# Patient Record
Sex: MALE | Race: WHITE
[De-identification: names, ages, dates, MRNs, and addresses within clinical notes are randomized per-mention and may not be internally consistent; named-entity substitution may affect disease eponyms.]

---

## 2022-09-21 ENCOUNTER — HOSPITAL ENCOUNTER (INPATIENT)
Dept: HOSPITAL 47 - 2ORMAIN | Age: 62
LOS: 1 days | Discharge: HOME | DRG: 38 | End: 2022-09-22
Attending: SURGERY | Admitting: SURGERY
Payer: COMMERCIAL

## 2022-09-21 DIAGNOSIS — Z79.02: ICD-10-CM

## 2022-09-21 DIAGNOSIS — Z79.82: ICD-10-CM

## 2022-09-21 DIAGNOSIS — E78.5: ICD-10-CM

## 2022-09-21 DIAGNOSIS — Z79.899: ICD-10-CM

## 2022-09-21 DIAGNOSIS — I42.9: ICD-10-CM

## 2022-09-21 DIAGNOSIS — I65.21: Primary | ICD-10-CM

## 2022-09-21 DIAGNOSIS — Z86.73: ICD-10-CM

## 2022-09-21 DIAGNOSIS — F17.200: ICD-10-CM

## 2022-09-21 DIAGNOSIS — I10: ICD-10-CM

## 2022-09-21 DIAGNOSIS — K59.00: ICD-10-CM

## 2022-09-21 LAB
ALBUMIN SERPL-MCNC: 4.2 G/DL (ref 3.5–5)
ALP SERPL-CCNC: 88 U/L (ref 38–126)
ALT SERPL-CCNC: 30 U/L (ref 4–49)
ANION GAP SERPL CALC-SCNC: 9 MMOL/L
APTT BLD: 25.7 SEC (ref 22–30)
AST SERPL-CCNC: 31 U/L (ref 17–59)
BASOPHILS # BLD AUTO: 0 K/UL (ref 0–0.2)
BASOPHILS NFR BLD AUTO: 0 %
BUN SERPL-SCNC: 23 MG/DL (ref 9–20)
CALCIUM SPEC-MCNC: 8.8 MG/DL (ref 8.4–10.2)
CHLORIDE SERPL-SCNC: 108 MMOL/L (ref 98–107)
CO2 SERPL-SCNC: 23 MMOL/L (ref 22–30)
EOSINOPHIL # BLD AUTO: 0.5 K/UL (ref 0–0.7)
EOSINOPHIL NFR BLD AUTO: 5 %
ERYTHROCYTE [DISTWIDTH] IN BLOOD BY AUTOMATED COUNT: 4.26 M/UL (ref 4.3–5.9)
ERYTHROCYTE [DISTWIDTH] IN BLOOD: 12.8 % (ref 11.5–15.5)
GLUCOSE BLD-MCNC: 123 MG/DL (ref 70–110)
GLUCOSE SERPL-MCNC: 105 MG/DL (ref 74–99)
HCT VFR BLD AUTO: 39.2 % (ref 39–53)
HGB BLD-MCNC: 13.4 GM/DL (ref 13–17.5)
INR PPP: 0.9 (ref ?–1.2)
LYMPHOCYTES # SPEC AUTO: 2.1 K/UL (ref 1–4.8)
LYMPHOCYTES NFR SPEC AUTO: 21 %
MCH RBC QN AUTO: 31.3 PG (ref 25–35)
MCHC RBC AUTO-ENTMCNC: 34 G/DL (ref 31–37)
MCV RBC AUTO: 92.1 FL (ref 80–100)
MONOCYTES # BLD AUTO: 0.4 K/UL (ref 0–1)
MONOCYTES NFR BLD AUTO: 4 %
NEUTROPHILS # BLD AUTO: 6.9 K/UL (ref 1.3–7.7)
NEUTROPHILS NFR BLD AUTO: 67 %
PLATELET # BLD AUTO: 257 K/UL (ref 150–450)
POTASSIUM SERPL-SCNC: 4.4 MMOL/L (ref 3.5–5.1)
PROT SERPL-MCNC: 6.6 G/DL (ref 6.3–8.2)
PT BLD: 10.1 SEC (ref 9–12)
SODIUM SERPL-SCNC: 140 MMOL/L (ref 137–145)
WBC # BLD AUTO: 10.2 K/UL (ref 3.8–10.6)

## 2022-09-21 PROCEDURE — 80048 BASIC METABOLIC PNL TOTAL CA: CPT

## 2022-09-21 PROCEDURE — 80053 COMPREHEN METABOLIC PANEL: CPT

## 2022-09-21 PROCEDURE — 86850 RBC ANTIBODY SCREEN: CPT

## 2022-09-21 PROCEDURE — 88305 TISSUE EXAM BY PATHOLOGIST: CPT

## 2022-09-21 PROCEDURE — 03UK0JZ SUPPLEMENT RIGHT INTERNAL CAROTID ARTERY WITH SYNTHETIC SUBSTITUTE, OPEN APPROACH: ICD-10-PCS

## 2022-09-21 PROCEDURE — 86900 BLOOD TYPING SEROLOGIC ABO: CPT

## 2022-09-21 PROCEDURE — 85610 PROTHROMBIN TIME: CPT

## 2022-09-21 PROCEDURE — 03CK0ZZ EXTIRPATION OF MATTER FROM RIGHT INTERNAL CAROTID ARTERY, OPEN APPROACH: ICD-10-PCS

## 2022-09-21 PROCEDURE — 88304 TISSUE EXAM BY PATHOLOGIST: CPT

## 2022-09-21 PROCEDURE — 88311 DECALCIFY TISSUE: CPT

## 2022-09-21 PROCEDURE — 86901 BLOOD TYPING SEROLOGIC RH(D): CPT

## 2022-09-21 PROCEDURE — 85025 COMPLETE CBC W/AUTO DIFF WBC: CPT

## 2022-09-21 PROCEDURE — 85730 THROMBOPLASTIN TIME PARTIAL: CPT

## 2022-09-21 RX ADMIN — POTASSIUM CHLORIDE SCH MLS: 14.9 INJECTION, SOLUTION INTRAVENOUS at 07:44

## 2022-09-21 RX ADMIN — POTASSIUM CHLORIDE SCH MLS: 14.9 INJECTION, SOLUTION INTRAVENOUS at 08:00

## 2022-09-21 RX ADMIN — NITROGLYCERIN SCH MLS/HR: 20 INJECTION INTRAVENOUS at 19:30

## 2022-09-21 RX ADMIN — NITROGLYCERIN SCH MLS: 20 INJECTION INTRAVENOUS at 12:19

## 2022-09-21 NOTE — P.PN
Progress Note - Text


Progress Note Date: 09/21/22





Patient was transferred to the ICU from postop this afternoon.  He is post right

carotid endarterectomy.  Patient overall is doing well.  Blood pressure stable. 

Arterial line is not functioning appropriately.  He is stating that he has pain 

in his neck and throat region.  No other complaints.  He has no focal deficits. 

His wife is at the bedside.  Pain medications were ordered. 








The impression and plan of care has been dictated as directed.








I performed a history and examination of this patient,  discussed the same with 

the dictator.  I agree with the dictator's note ,documented as a scribe.  Any 

additional findings or plans will be noted.

## 2022-09-21 NOTE — P.OP
Date of Procedure: 09/21/22


Description of Procedure: 


Preoperative Diagnosis: Symptomatic right Internal carotid artery stenosis


Postoperative Diagnosis: Same


Procedure: [Right] carotid endarterectomy with patch angioplasty


Anesthesia: DELBERT


Surgeon: Hannah Triplett DO


Assistant: Estrella


Estimated Blood Loss (ml): [75 mL]


IV Fluids: [See records]


Urine Output: [See records]


Specimen: [Right carotid plaque, right cervical lymph node]


Condition: stable


Disposition: PACU





Findings and indications: [The patient is a 62-year-old male who previously had 

issues with arm that resolved.  He had an MRI which showed scattered ischemic 

lesions consistent with asymptomatic carotid artery stenosis, on imaging he had 

with looked like an ruptured plaque with thrombus therefore at this time he was 

offered a carotid endarterectomy.  He did have a notably high lesion however 

given his age it was felt that a carotid endarterectomy would have better 

longevity than a stent at this time.  Risks and benefits were discussed with the

 family who seemingly understood and are willing to proceed]





Procedure in detail:  


After written informed consent was obtained the patient all risks benefits and 

competitions were described the patient is brought to the operative suite and 

laid in a supine position.  The area of the neck was prepped and draped in usual

sterile fashion after appropriate anesthetic was performed per the 

anesthesiologist.  A timeout was performed in normal fashion antibiotics were 

administered prior to incision.





An oblique incision was then created just anterior to the sternocleidomastoid 

musculature with a 10 blade scalpel and dissection was carried down to the 

carotid sheath.  The carotid sheath was then entered after facial vein was 

located and suture ligated in normal fashion.  It was notably high.  The common 

carotid, internal carotid, external carotid and superior thyroid arteries were 

located and dissected free in a meticulous fashion circumferentially and 

controlled with vessel loops.  There was significant adherent tissue at the 

level of the carotid bulb.  The internal carotid appeared dilated at the takeoff

therefore it was cleared to a portion more cephalad that appeared more normal in

caliber.  This was up by the digastric belly.  Attention was then placed to 

locating the vagus nerve as well as hypoglossal nerve which were both spared.  

Retraction was put on the hypoglossal nerve as it did cross over the area of the

bulb in order to properly visualize portions needed, this retraction was 

necessary.





Once controlled, patient was administered heparin and followed with ACTs for 

appropriate heparinization.  Once ACT was appropriate, the proximal and distal 

aspects of the dissection were then controlled with vascular clamps.  

Arteriotomy was then created with 11 blade scalpel and extended with Vargas Padron

scissors.  Cervical oximetry was previously applied.  Numbers were monitored.  

There was not significant change once the patient was clamped therefore No shunt

was required.  An endarterectomy was then performed with a [place] elevator.  The 

plaque was transected proximally and then feathered at the distal aspect of the 

internal carotid artery and removed.  The area was copiously irrigated with 

heparinized saline and all free debris was removed. 





A 0.8 x 8 cm bovine pericardial patch was then chosen and patch angioplasty was 

performed with 6-0 Prolene suture in a running fashion.  Prior to last sutures 

being placed the inflow was released flushing any free debris out of the patch. 

This was reclamped and the internal carotid artery was released revealing good 

brisk flow and was once again reclamped.  The external carotid and superior 

thyroid artery were then released followed by the common carotid artery to allow

any free debris to be flushed into the external system.  Final sutures were 

placed and secured.  Internal carotid artery control was then released.  Good 

pulsatile flow was noted through the patch and a Doppler was utilized 

demonstrating good brisk flow into the internal, external carotid arteries 

without any signs of obstruction.  





Hemostasis was then assured with interrupted sutures of 6-0 Prolene as well as 

thrombin and Gelfoam.  A 10-Faroese IONA drain was then placed in normal fashion 

and secured with 3-0 nylon suture.  The incision was then closed in a multilayer

fashion after hemostasis was assured.  The skin was then cleansed and dressings 

were placed.  Patient tolerated the procedure well and was following commands 

and moving all extremities.  Patient was then sent to PACU for recovery.

## 2022-09-22 VITALS — SYSTOLIC BLOOD PRESSURE: 168 MMHG | DIASTOLIC BLOOD PRESSURE: 94 MMHG | HEART RATE: 57 BPM

## 2022-09-22 VITALS — TEMPERATURE: 97.9 F

## 2022-09-22 VITALS — RESPIRATION RATE: 19 BRPM

## 2022-09-22 LAB
ANION GAP SERPL CALC-SCNC: 12 MMOL/L
BASOPHILS # BLD AUTO: 0.1 K/UL (ref 0–0.2)
BASOPHILS NFR BLD AUTO: 0 %
BUN SERPL-SCNC: 18 MG/DL (ref 9–20)
CALCIUM SPEC-MCNC: 8.8 MG/DL (ref 8.4–10.2)
CHLORIDE SERPL-SCNC: 103 MMOL/L (ref 98–107)
CO2 SERPL-SCNC: 24 MMOL/L (ref 22–30)
EOSINOPHIL # BLD AUTO: 0 K/UL (ref 0–0.7)
EOSINOPHIL NFR BLD AUTO: 0 %
ERYTHROCYTE [DISTWIDTH] IN BLOOD BY AUTOMATED COUNT: 4.23 M/UL (ref 4.3–5.9)
ERYTHROCYTE [DISTWIDTH] IN BLOOD: 12.4 % (ref 11.5–15.5)
GLUCOSE SERPL-MCNC: 172 MG/DL (ref 74–99)
HCT VFR BLD AUTO: 39.1 % (ref 39–53)
HGB BLD-MCNC: 13 GM/DL (ref 13–17.5)
LYMPHOCYTES # SPEC AUTO: 2.2 K/UL (ref 1–4.8)
LYMPHOCYTES NFR SPEC AUTO: 16 %
MCH RBC QN AUTO: 30.8 PG (ref 25–35)
MCHC RBC AUTO-ENTMCNC: 33.3 G/DL (ref 31–37)
MCV RBC AUTO: 92.4 FL (ref 80–100)
MONOCYTES # BLD AUTO: 0.7 K/UL (ref 0–1)
MONOCYTES NFR BLD AUTO: 5 %
NEUTROPHILS # BLD AUTO: 10.9 K/UL (ref 1.3–7.7)
NEUTROPHILS NFR BLD AUTO: 78 %
PLATELET # BLD AUTO: 248 K/UL (ref 150–450)
POTASSIUM SERPL-SCNC: 3.5 MMOL/L (ref 3.5–5.1)
SODIUM SERPL-SCNC: 139 MMOL/L (ref 137–145)
WBC # BLD AUTO: 14 K/UL (ref 3.8–10.6)

## 2022-09-22 NOTE — P.DS
Providers


Date of admission: 


09/21/22 07:03





Expected date of discharge: 09/22/22


Attending physician: 


Hannah Triplett DO





Consults: 





                                        





09/21/22 12:46


Consult Physician Routine 


   Consulting Provider: Rosa Gonzalez


   Consult Reason/Comments: medical management s/p carotid endarterectomy


   Do you want consulting provider notified?: Yes











Primary care physician: 


[facility] Course: 





This is a 62-year-old male who with a history of symptomatic right carotid 

artery stenosis with a notably high lesion who underwent a right carotid 

endarterectomy yesterday.  He is postop day #1 and remains in the ICU.  Blood 

pressures have been stabilizing, he continues to be on a nitro drip however that

has been weaned.  Patient states his pain in his neck is improved but still 

sore.  He has a Triplett catheter intact with good urine output.  He ate a regular 

breakfast and tolerated it well.  He denies any focal deficits, shortness of 

breath, chest pain, abdominal pain, nausea vomiting fevers or chills.  He is 

afebrile.  He has had approximately 60 ml of serosanguineous drainage out of his

IONA drain.








Exam:


General appearance: The patient is alert, oriented, appears in no acute di

stress.


HET: Head is normocephalic and atraumatic.  Pupils are equal and reactive.  


Neck: Supple.  Right surgical incision well approximated with glue, no hematoma.

 IONA drain intact, removed.


Heart: S1 S2.  Regular rate and rhythm.


Lungs: Clear to auscultation bilaterally.


Abdomen: Soft, nontender, nondistended.


Extremities: Normal skin color and turgor.  Palpable bilateral +2 radial pulses.


Neurological: No focal deficits.  Strength and sensation are grossly intact.








Assessment:


1.  Postop day right carotid endarterectomy with patch angioplasty


2.  Symptomatic right carotid artery stenosis


3.  Hypertension


4.  Hyperlipidemia





Plan:


1.  Discontinue Triplett catheter


2.  Resume Brilinta


3.  Discontinue IONA drain


4.  Encourage ambulation


5.  Will plan for discharge home this afternoon after patient is able to void, 

ambulate, and blood pressure is stable.








The impression and plan of care has been dictated as directed.





Dr. Triplett


I performed a history and examination of this patient,  discussed the same with 

the dictator.  I agree with the dictator's note ,documented as a scribe.  Any 

additional findings or plans will be noted.


Procedures: 





Preoperative Diagnosis: Symptomatic right Internal carotid artery stenosis


Postoperative Diagnosis: Same


Procedure: [Right] carotid endarterectomy with patch angioplasty





Patient Condition at Discharge: Stable





Plan - Discharge Summary


Discharge Rx Participant: No


New Discharge Prescriptions: 


New


   Acetaminophen Tab [Tylenol] 650 mg PO Q4HR PRN  tab


     PRN Reason: Pain





Continue


   Ticagrelor [Brilinta] 90 mg PO BID #60 tab


   Losartan Potassium 50 mg PO QAM


   Atorvastatin [Lipitor] 80 mg PO QAM


   Aspirin 81 mg PO QAM


Discharge Medication List





Losartan Potassium 50 mg PO QAM 08/18/22 [History]


Ticagrelor [Brilinta] 90 mg PO BID #60 tab 08/20/22 [Rx]


Aspirin 81 mg PO QAM 09/19/22 [History]


Atorvastatin [Lipitor] 80 mg PO QAM 09/19/22 [History]


Acetaminophen Tab [Tylenol] 650 mg PO Q4HR PRN  tab 09/22/22 [Rx]








Follow up Appointment(s)/Referral(s): 


Hannah Tripltet DO [STAFF PHYSICIAN] - 1 Week


Patient Instructions/Handouts:  Carotid Endarterectomy (DC)


Activity/Diet/Wound Care/Special Instructions: 


No strenuous activity or heavy lifting greater than 10 pounds.  May shower 

tomorrow 9/23/22 but no tub bathing or soaking.  Watch incision site for 

infection including redness, drainage, or temperature greater than 100.4.  If 

you notice he symptoms please call office or go to the emergency room.


Discharge Disposition: HOME SELF-CARE
